# Patient Record
Sex: FEMALE | Race: WHITE | Employment: PART TIME | URBAN - METROPOLITAN AREA
[De-identification: names, ages, dates, MRNs, and addresses within clinical notes are randomized per-mention and may not be internally consistent; named-entity substitution may affect disease eponyms.]

---

## 2017-07-24 ENCOUNTER — OFFICE VISIT (OUTPATIENT)
Dept: ORTHOPEDIC SURGERY | Age: 47
End: 2017-07-24

## 2017-07-24 ENCOUNTER — TELEPHONE (OUTPATIENT)
Dept: ORTHOPEDIC SURGERY | Age: 47
End: 2017-07-24

## 2017-07-24 VITALS
HEART RATE: 78 BPM | WEIGHT: 158.07 LBS | HEIGHT: 68 IN | DIASTOLIC BLOOD PRESSURE: 66 MMHG | BODY MASS INDEX: 23.96 KG/M2 | SYSTOLIC BLOOD PRESSURE: 122 MMHG

## 2017-07-24 DIAGNOSIS — S92.352A JONES FRACTURE, LEFT, CLOSED, INITIAL ENCOUNTER: Primary | ICD-10-CM

## 2017-07-24 PROBLEM — S99.199A JONES FRACTURE: Status: ACTIVE | Noted: 2017-07-24

## 2017-07-24 PROCEDURE — L4361 PNEUMA/VAC WALK BOOT PRE OTS: HCPCS | Performed by: PODIATRIST

## 2017-07-24 PROCEDURE — 99214 OFFICE O/P EST MOD 30 MIN: CPT | Performed by: PODIATRIST

## 2017-07-24 RX ORDER — MINOCYCLINE HYDROCHLORIDE 50 MG/1
CAPSULE ORAL
Refills: 4 | COMMUNITY
Start: 2017-06-15

## 2017-08-14 ENCOUNTER — OFFICE VISIT (OUTPATIENT)
Dept: ORTHOPEDIC SURGERY | Age: 47
End: 2017-08-14

## 2017-08-14 VITALS
DIASTOLIC BLOOD PRESSURE: 65 MMHG | SYSTOLIC BLOOD PRESSURE: 110 MMHG | HEIGHT: 68 IN | WEIGHT: 158.07 LBS | HEART RATE: 78 BPM | BODY MASS INDEX: 23.96 KG/M2

## 2017-08-14 DIAGNOSIS — S92.352D JONES FRACTURE, LEFT, WITH ROUTINE HEALING, SUBSEQUENT ENCOUNTER: ICD-10-CM

## 2017-08-14 DIAGNOSIS — M79.672 FOOT PAIN, LEFT: Primary | ICD-10-CM

## 2017-08-14 PROCEDURE — 99213 OFFICE O/P EST LOW 20 MIN: CPT | Performed by: PODIATRIST

## 2017-08-14 PROCEDURE — 73630 X-RAY EXAM OF FOOT: CPT | Performed by: PODIATRIST

## 2017-09-07 ENCOUNTER — OFFICE VISIT (OUTPATIENT)
Dept: ORTHOPEDIC SURGERY | Age: 47
End: 2017-09-07

## 2017-09-07 VITALS
WEIGHT: 156.75 LBS | BODY MASS INDEX: 23.76 KG/M2 | SYSTOLIC BLOOD PRESSURE: 103 MMHG | DIASTOLIC BLOOD PRESSURE: 65 MMHG | HEIGHT: 68 IN | HEART RATE: 70 BPM

## 2017-09-07 DIAGNOSIS — M79.672 FOOT PAIN, LEFT: Primary | ICD-10-CM

## 2017-09-07 DIAGNOSIS — S92.352D JONES FRACTURE, LEFT, WITH ROUTINE HEALING, SUBSEQUENT ENCOUNTER: ICD-10-CM

## 2017-09-07 PROCEDURE — 99213 OFFICE O/P EST LOW 20 MIN: CPT | Performed by: PODIATRIST

## 2017-09-07 PROCEDURE — 73630 X-RAY EXAM OF FOOT: CPT | Performed by: PODIATRIST

## 2017-10-02 ENCOUNTER — OFFICE VISIT (OUTPATIENT)
Dept: ORTHOPEDIC SURGERY | Age: 47
End: 2017-10-02

## 2017-10-02 VITALS
SYSTOLIC BLOOD PRESSURE: 122 MMHG | BODY MASS INDEX: 23.76 KG/M2 | HEIGHT: 68 IN | WEIGHT: 156.75 LBS | HEART RATE: 71 BPM | DIASTOLIC BLOOD PRESSURE: 77 MMHG

## 2017-10-02 DIAGNOSIS — S92.352D JONES FRACTURE, LEFT, WITH ROUTINE HEALING, SUBSEQUENT ENCOUNTER: ICD-10-CM

## 2017-10-02 DIAGNOSIS — M79.672 FOOT PAIN, LEFT: Primary | ICD-10-CM

## 2017-10-02 PROCEDURE — 99213 OFFICE O/P EST LOW 20 MIN: CPT | Performed by: PODIATRIST

## 2017-10-02 NOTE — MR AVS SNAPSHOT
Progesterone 100 MG CAPS Take  by mouth daily. venlafaxine (EFFEXOR) 75 MG tablet Take 75 mg by mouth 2 times daily. gabapentin (NEURONTIN) 600 MG tablet   Take by mouth 3 times daily 1 in am, 2 tabs in pm    Nutritional Supplements (HRT SUPPORT PO) Take  by mouth. Pellet injection    traZODone (DESYREL) 50 MG tablet 25 mg nightly. Allergies              Penicillins Rash    Sulfa Antibiotics Rash    Codeine Other (See Comments)    depressed    Doxycycline Other (See Comments)    Body aches         Additional Information        Basic Information     Date Of Birth Sex Race Ethnicity Preferred Language    1970 Female White Non-/Non  English      Problem List as of 10/2/2017  Date Reviewed: 10/2/2017                Fred Friend fracture    Status post debridement, lateral epicondyle     Tennis elbow syndrome    Tennis elbow    Right elbow pain      Preventive Care        Date Due    HIV screening is recommended for all people regardless of risk factors  aged 15-65 years at least once (lifetime) who have never been HIV tested. 4/10/1985    Tetanus Combination Vaccine (1 - Tdap) 4/10/1989    Pap Smear 4/10/1991    Cholesterol Screening 4/10/2010    Yearly Flu Vaccine (1) 9/1/2017            [employer] Signup           [employer] allows you to send messages to your doctor, view your test results, renew your prescriptions, schedule appointments, view visit notes, and more. How Do I Sign Up? 1. In your Internet browser, go to [email or web address]  2. Click on the Sign Up Now link in the Sign In box. You will see the New Member Sign Up page. 3. Enter your [employer] Access Code exactly as it appears below. You will not need to use this code after youve completed the sign-up process. If you do not sign up before the expiration date, you must request a new code.   [employer] Access Code: [email or web address]  Expires: 12/1/2017  9:41 AM 4. Enter your Social Security Number ([id number]) and Date of Birth (mm/dd/yyyy) as indicated and click Submit. You will be taken to the next sign-up page. 5. Create a [employer]Leaf ID. This will be your [employer] login ID and cannot be changed, so think of one that is secure and easy to remember. 6. Create a [employer]Leaf password. You can change your password at any time. 7. Enter your Password Reset Question and Answer. This can be used at a later time if you forget your password. 8. Enter your e-mail address. You will receive e-mail notification when new information is available in [address] 9. Click Sign Up. You can now view your medical record. Additional Information  If you have questions, please contact the physician practice where you receive care. Remember, [employer]Le[employer] is NOT to be used for urgent needs. For medical emergencies, dial 911. For questions regarding your [employer]Le[employer] account call [phone]. If you have a clinical question, please call your doctor's office.

## 2017-10-23 ENCOUNTER — OFFICE VISIT (OUTPATIENT)
Dept: ORTHOPEDIC SURGERY | Age: 47
End: 2017-10-23

## 2017-10-23 VITALS
WEIGHT: 156.75 LBS | HEART RATE: 68 BPM | SYSTOLIC BLOOD PRESSURE: 106 MMHG | HEIGHT: 68 IN | DIASTOLIC BLOOD PRESSURE: 72 MMHG | BODY MASS INDEX: 23.76 KG/M2

## 2017-10-23 DIAGNOSIS — S99.192G CLOSED FRACTURE OF BASE OF FIFTH METATARSAL BONE OF LEFT FOOT AT METAPHYSEAL-DIAPHYSEAL JUNCTION WITH DELAYED HEALING, SUBSEQUENT ENCOUNTER: ICD-10-CM

## 2017-10-23 DIAGNOSIS — M79.672 LEFT FOOT PAIN: Primary | ICD-10-CM

## 2017-10-23 PROCEDURE — 99213 OFFICE O/P EST LOW 20 MIN: CPT | Performed by: PODIATRIST

## 2017-10-23 PROCEDURE — 73630 X-RAY EXAM OF FOOT: CPT | Performed by: PODIATRIST

## 2017-10-30 ENCOUNTER — OFFICE VISIT (OUTPATIENT)
Dept: ORTHOPEDIC SURGERY | Age: 47
End: 2017-10-30

## 2017-10-30 VITALS
HEART RATE: 75 BPM | HEIGHT: 68 IN | WEIGHT: 156.75 LBS | BODY MASS INDEX: 23.76 KG/M2 | DIASTOLIC BLOOD PRESSURE: 76 MMHG | SYSTOLIC BLOOD PRESSURE: 118 MMHG

## 2017-10-30 DIAGNOSIS — S99.192K CLOSED FRACTURE OF BASE OF FIFTH METATARSAL BONE OF LEFT FOOT AT METAPHYSEAL-DIAPHYSEAL JUNCTION WITH NONUNION, SUBSEQUENT ENCOUNTER: Primary | ICD-10-CM

## 2017-10-30 PROCEDURE — 99213 OFFICE O/P EST LOW 20 MIN: CPT | Performed by: PODIATRIST

## 2017-10-30 NOTE — PROGRESS NOTES
HISTORY OF PRESENT ILLNESS:  This is a followup for a Soliz fracture of the left foot. The patient is still having pain. PHYSICAL EXAM:  There is minimal edema and there is no ecchymosis. There is mild pain on palpation over the fracture site. Pedal pulses are palpable. Sensation is grossly intact. X-RAYS:  Review of her left foot MRI demonstrates a nonhealed Soliz fracture of the 5th metatarsal.      ASSESSMENT: Soliz Fracture, nonunion left foot    PLAN:  I reveiwed the MRI with the patient. I recommended open reduction internal fixation of the chronic fracture. Different surgical options were presented. The potential complications of any surgery were discussed. These included but were not limited to infection, pain, swelling, bleeding, numbness, recurrence of the problem, delayed union, nonunion, and need for further surgery to address complications. The patient verbally acknowledged consideration of these potential complications. I feel that have answered all of the preop questions regarding this problem. Certainly if they have any further questions, they can call the office. She will get with [facility] to schedule the procedure. In the meantime, she will continue with the boot for symptomatic relief.

## 2017-11-03 ENCOUNTER — TELEPHONE (OUTPATIENT)
Dept: ORTHOPEDIC SURGERY | Age: 47
End: 2017-11-03

## 2017-11-07 DIAGNOSIS — S99.192K CLOSED FRACTURE OF BASE OF FIFTH METATARSAL BONE OF LEFT FOOT AT METAPHYSEAL-DIAPHYSEAL JUNCTION WITH NONUNION, SUBSEQUENT ENCOUNTER: ICD-10-CM

## 2017-11-09 LAB
VITAMIN D2 AND D3, TOTAL: 92.4 NG/ML (ref 30–80)
VITAMIN D2, 25 HYDROXY: <1 NG/ML
VITAMIN D3,25 HYDROXY: 92.4 NG/ML

## 2017-11-10 ENCOUNTER — HOSPITAL ENCOUNTER (OUTPATIENT)
Dept: SURGERY | Age: 47
Discharge: HOME | End: 2017-11-10
Attending: PODIATRIST | Admitting: PODIATRIST

## 2017-11-10 VITALS
BODY MASS INDEX: 25.01 KG/M2 | HEART RATE: 66 BPM | HEIGHT: 68 IN | RESPIRATION RATE: 16 BRPM | TEMPERATURE: 98.6 F | DIASTOLIC BLOOD PRESSURE: 78 MMHG | OXYGEN SATURATION: 98 % | WEIGHT: 165 LBS | SYSTOLIC BLOOD PRESSURE: 148 MMHG

## 2017-11-10 LAB — PREGNANCY, URINE: NEGATIVE

## 2017-11-10 RX ORDER — ONDANSETRON 2 MG/ML
4 INJECTION INTRAMUSCULAR; INTRAVENOUS EVERY 30 MIN PRN
Status: DISCONTINUED | OUTPATIENT
Start: 2017-11-10 | End: 2017-11-11 | Stop reason: HOSPADM

## 2017-11-10 RX ORDER — MEPERIDINE HYDROCHLORIDE 50 MG/ML
12.5 INJECTION INTRAMUSCULAR; INTRAVENOUS; SUBCUTANEOUS EVERY 5 MIN PRN
Status: DISCONTINUED | OUTPATIENT
Start: 2017-11-10 | End: 2017-11-11 | Stop reason: HOSPADM

## 2017-11-10 RX ORDER — OXYCODONE HYDROCHLORIDE AND ACETAMINOPHEN 5; 325 MG/1; MG/1
1 TABLET ORAL PRN
Status: ACTIVE | OUTPATIENT
Start: 2017-11-10 | End: 2017-11-10

## 2017-11-10 RX ORDER — SODIUM CHLORIDE 0.9 % (FLUSH) 0.9 %
10 SYRINGE (ML) INJECTION EVERY 12 HOURS SCHEDULED
Status: DISCONTINUED | OUTPATIENT
Start: 2017-11-10 | End: 2017-11-11 | Stop reason: HOSPADM

## 2017-11-10 RX ORDER — SODIUM CHLORIDE, SODIUM LACTATE, POTASSIUM CHLORIDE, CALCIUM CHLORIDE 600; 310; 30; 20 MG/100ML; MG/100ML; MG/100ML; MG/100ML
INJECTION, SOLUTION INTRAVENOUS CONTINUOUS
Status: DISCONTINUED | OUTPATIENT
Start: 2017-11-10 | End: 2017-11-11 | Stop reason: HOSPADM

## 2017-11-10 RX ORDER — DIPHENHYDRAMINE HYDROCHLORIDE 50 MG/ML
6.25 INJECTION INTRAMUSCULAR; INTRAVENOUS
Status: ACTIVE | OUTPATIENT
Start: 2017-11-10 | End: 2017-11-10

## 2017-11-10 RX ORDER — OXYCODONE HYDROCHLORIDE AND ACETAMINOPHEN 5; 325 MG/1; MG/1
2 TABLET ORAL PRN
Status: ACTIVE | OUTPATIENT
Start: 2017-11-10 | End: 2017-11-10

## 2017-11-10 RX ORDER — HYDRALAZINE HYDROCHLORIDE 20 MG/ML
5 INJECTION INTRAMUSCULAR; INTRAVENOUS EVERY 30 MIN PRN
Status: DISCONTINUED | OUTPATIENT
Start: 2017-11-10 | End: 2017-11-11 | Stop reason: HOSPADM

## 2017-11-10 RX ORDER — VENLAFAXINE HYDROCHLORIDE 37.5 MG/1
112.5 CAPSULE, EXTENDED RELEASE ORAL
Status: DISCONTINUED | OUTPATIENT
Start: 2017-11-11 | End: 2017-11-11 | Stop reason: HOSPADM

## 2017-11-10 RX ORDER — SODIUM CHLORIDE 0.9 % (FLUSH) 0.9 %
10 SYRINGE (ML) INJECTION PRN
Status: DISCONTINUED | OUTPATIENT
Start: 2017-11-10 | End: 2017-11-11 | Stop reason: HOSPADM

## 2017-11-10 RX ORDER — PROMETHAZINE HYDROCHLORIDE 25 MG/1
25 TABLET ORAL EVERY 6 HOURS PRN
Qty: 30 TABLET | Refills: 0 | Status: SHIPPED | OUTPATIENT
Start: 2017-11-10 | End: 2017-11-17

## 2017-11-10 RX ORDER — LIDOCAINE HYDROCHLORIDE 10 MG/ML
1 INJECTION, SOLUTION EPIDURAL; INFILTRATION; INTRACAUDAL; PERINEURAL
Status: ACTIVE | OUTPATIENT
Start: 2017-11-10 | End: 2017-11-10

## 2017-11-10 RX ORDER — LABETALOL HYDROCHLORIDE 5 MG/ML
5 INJECTION, SOLUTION INTRAVENOUS
Status: DISCONTINUED | OUTPATIENT
Start: 2017-11-10 | End: 2017-11-11 | Stop reason: HOSPADM

## 2017-11-10 RX ADMIN — VENLAFAXINE HYDROCHLORIDE 112.5 MG: 37.5 CAPSULE, EXTENDED RELEASE ORAL at 10:31

## 2017-11-10 RX ADMIN — SODIUM CHLORIDE, SODIUM LACTATE, POTASSIUM CHLORIDE, CALCIUM CHLORIDE: 600; 310; 30; 20 INJECTION, SOLUTION INTRAVENOUS at 07:37

## 2017-11-10 ASSESSMENT — PAIN SCALES - GENERAL
PAINLEVEL_OUTOF10: 0

## 2017-11-10 ASSESSMENT — PAIN - FUNCTIONAL ASSESSMENT: PAIN_FUNCTIONAL_ASSESSMENT: 0-10

## 2017-11-10 NOTE — ANESTHESIA PRE-OP
Department of Anesthesiology  Preprocedure Note       Name:  Nevaeh Escalante   Age:  52 y.o.  :  1970                                          MRN:  [id number]         Date:  11/10/2017      Surgeon:    Procedure:    Medications prior to admission:   Prior to Admission medications    Medication Sig Start Date End Date Taking? Authorizing Provider   Probiotic Product (PROBIOTIC PO) Take by mouth 2 times daily   Yes Historical Provider, MD   Cholecalciferol (VITAMIN D PO) Take 10,000 Units by mouth 2 times daily   Yes Historical Provider, MD   magnesium (MAGNESIUM-OXIDE) 250 MG TABS tablet Take 250 mg by mouth daily   Yes Historical Provider, MD   Cyanocobalamin (VITAMIN B12 SL) Place under the tongue daily   Yes Historical Provider, MD   zinc 50 MG CAPS Take by mouth daily   Yes Historical Provider, MD   Bobo Ashly Oil 1000 MG CAPS Take by mouth daily   Yes Historical Provider, MD   Milk Thistle 250 MG CAPS Take by mouth daily   Yes Historical Provider, MD   VITAMIN K PO Take 100 mcg by mouth daily   Yes Historical Provider, MD   Yanelis Root 500 MG CAPS Take by mouth every other day   Yes Historical Provider, MD   EVENING PRIMROSE OIL PO Take 1,500 mg by mouth every other day   Yes Historical Provider, MD   Ubiquinol 100 MG CAPS Take 100 mg by mouth daily   Yes Historical Provider, MD   Ascorbic Acid (VITAMIN C) 1000 MG tablet Take 1,000 mg by mouth daily   Yes Historical Provider, MD   Nettle Root POWD 450 mg by Does not apply route daily   Yes Historical Provider, MD   minocycline (MINOCIN;DYNACIN) 50 MG capsule  6/15/17  Yes Historical Provider, MD   Progesterone 100 MG CAPS Take  by mouth daily. Yes Historical Provider, MD   venlafaxine (EFFEXOR) 75 MG tablet Take 150 mg by mouth 2 times daily    Yes Historical Provider, MD   gabapentin (NEURONTIN) 600 MG tablet Take 1,200 mg by mouth 2 times daily 1 in am, 2 tabs in pm    Yes Historical Provider, MD   Nutritional Supplements (HRT SUPPORT PO) Take  by mouth. Never Used    Alcohol use 0.5 oz/week     1 Standard drinks or equivalent per week                                Counseling given: Not Answered      Vital Signs (Current):   Vitals:    11/10/17 0721   BP: 122/83   Pulse: 67   Resp: 20   Temp: 98.3 °F (36.8 °C)   TempSrc: Temporal   SpO2: 97%   Weight: 165 lb (74.8 kg)   Height: 5' 8\" (1.727 m)                                              BP Readings from Last 3 Encounters:   11/10/17 122/83   10/30/17 118/76   10/23/17 106/72       NPO Status: Time of last liquid consumption: 2300                        Time of last solid consumption: 1900                        Date of last liquid consumption: 11/09/17                        Date of last solid food consumption: 11/09/17    BMI:   Wt Readings from Last 3 Encounters:   11/10/17 165 lb (74.8 kg)   11/06/17 165 lb (74.8 kg)   10/30/17 156 lb 12 oz (71.1 kg)     Body mass index is 25.09 kg/m². Anesthesia Evaluation  Patient summary reviewed and Nursing notes reviewed no history of anesthetic complications:   Airway: Mallampati: II     Neck ROM: full   Dental:          Pulmonary:                              Cardiovascular:                      Neuro/Psych:   (+) psychiatric history:            GI/Hepatic/Renal:             Endo/Other:                     Abdominal:           Vascular:                                      Anesthesia Plan      general     ASA 1     (Medications & allergies reviewed  All available lab & EKG data reviewed)  Induction: intravenous. Anesthetic plan and risks discussed with patient. Plan discussed with CRNA.                   Sergio St MD   11/10/2017

## 2017-11-16 ENCOUNTER — OFFICE VISIT (OUTPATIENT)
Dept: ORTHOPEDIC SURGERY | Age: 47
End: 2017-11-16

## 2017-11-16 DIAGNOSIS — Z09 POSTOP CHECK: Primary | ICD-10-CM

## 2017-11-16 PROCEDURE — 73630 X-RAY EXAM OF FOOT: CPT | Performed by: PODIATRIST

## 2017-11-16 PROCEDURE — 99024 POSTOP FOLLOW-UP VISIT: CPT | Performed by: PODIATRIST

## 2017-11-16 NOTE — PROGRESS NOTES
Initial postop check. She denies any fever or chills. No signs of infection are present. 3 nonweightbearing views of the left foot were taken. This demonstrates excellent position of the Soliz bolt with good  Position of the fracture ends. Status post Soliz fracture percutaneous fixation left foot and one week    She will remain nonweightbearing. See postop dressing was changed. I'll see her back in 10 days.

## 2017-11-27 ENCOUNTER — OFFICE VISIT (OUTPATIENT)
Dept: ORTHOPEDIC SURGERY | Age: 47
End: 2017-11-27

## 2017-11-27 VITALS — BODY MASS INDEX: 23.7 KG/M2 | WEIGHT: 160 LBS | HEIGHT: 69 IN

## 2017-11-27 DIAGNOSIS — Z09 POSTOP CHECK: Primary | ICD-10-CM

## 2017-11-27 PROCEDURE — 99024 POSTOP FOLLOW-UP VISIT: CPT | Performed by: PODIATRIST

## 2017-12-11 ENCOUNTER — OFFICE VISIT (OUTPATIENT)
Dept: ORTHOPEDIC SURGERY | Age: 47
End: 2017-12-11

## 2017-12-11 VITALS
BODY MASS INDEX: 23.71 KG/M2 | HEART RATE: 66 BPM | SYSTOLIC BLOOD PRESSURE: 130 MMHG | DIASTOLIC BLOOD PRESSURE: 74 MMHG | HEIGHT: 69 IN | WEIGHT: 160.05 LBS

## 2017-12-11 DIAGNOSIS — Z09 POSTOP CHECK: Primary | ICD-10-CM

## 2017-12-11 PROCEDURE — 73630 X-RAY EXAM OF FOOT: CPT | Performed by: PODIATRIST

## 2017-12-11 PROCEDURE — 99024 POSTOP FOLLOW-UP VISIT: CPT | Performed by: PODIATRIST

## 2018-01-08 ENCOUNTER — OFFICE VISIT (OUTPATIENT)
Dept: ORTHOPEDIC SURGERY | Age: 48
End: 2018-01-08

## 2018-01-08 VITALS
SYSTOLIC BLOOD PRESSURE: 110 MMHG | BODY MASS INDEX: 23.71 KG/M2 | HEIGHT: 69 IN | DIASTOLIC BLOOD PRESSURE: 68 MMHG | WEIGHT: 160.05 LBS | HEART RATE: 74 BPM

## 2018-01-08 DIAGNOSIS — S99.192S CLOSED FRACTURE OF BASE OF FIFTH METATARSAL BONE OF LEFT FOOT AT METAPHYSEAL-DIAPHYSEAL JUNCTION, SEQUELA: ICD-10-CM

## 2018-01-08 DIAGNOSIS — Z09 POSTOP CHECK: Primary | ICD-10-CM

## 2018-01-08 PROCEDURE — 99024 POSTOP FOLLOW-UP VISIT: CPT | Performed by: PODIATRIST

## 2018-01-08 PROCEDURE — 73630 X-RAY EXAM OF FOOT: CPT | Performed by: PODIATRIST

## 2018-02-05 ENCOUNTER — OFFICE VISIT (OUTPATIENT)
Dept: ORTHOPEDIC SURGERY | Age: 48
End: 2018-02-05

## 2018-02-05 DIAGNOSIS — S99.192S CLOSED FRACTURE OF BASE OF FIFTH METATARSAL BONE OF LEFT FOOT AT METAPHYSEAL-DIAPHYSEAL JUNCTION, SEQUELA: Primary | ICD-10-CM

## 2018-02-05 DIAGNOSIS — Z09 POSTOP CHECK: ICD-10-CM

## 2018-02-05 PROCEDURE — 99024 POSTOP FOLLOW-UP VISIT: CPT | Performed by: PODIATRIST

## 2018-02-14 DIAGNOSIS — S99.192S CLOSED FRACTURE OF BASE OF FIFTH METATARSAL BONE OF LEFT FOOT AT METAPHYSEAL-DIAPHYSEAL JUNCTION, SEQUELA: Primary | ICD-10-CM

## 2018-02-26 ENCOUNTER — OFFICE VISIT (OUTPATIENT)
Dept: ORTHOPEDIC SURGERY | Age: 48
End: 2018-02-26

## 2018-02-26 VITALS
BODY MASS INDEX: 23.71 KG/M2 | HEIGHT: 69 IN | DIASTOLIC BLOOD PRESSURE: 60 MMHG | WEIGHT: 160.05 LBS | HEART RATE: 70 BPM | SYSTOLIC BLOOD PRESSURE: 110 MMHG

## 2018-02-26 DIAGNOSIS — Z09 POSTOP CHECK: Primary | ICD-10-CM

## 2018-02-26 DIAGNOSIS — S99.192S CLOSED FRACTURE OF BASE OF FIFTH METATARSAL BONE OF LEFT FOOT AT METAPHYSEAL-DIAPHYSEAL JUNCTION, SEQUELA: ICD-10-CM

## 2018-02-26 PROCEDURE — 99999 PR OFFICE/OUTPT VISIT,PROCEDURE ONLY: CPT | Performed by: PODIATRIST

## 2018-02-26 NOTE — PROGRESS NOTES
This is an approximate 4 month postop check for the left foot Soliz fracture ORIF. She states that she's doing well and not having pain with normal daily activity. She has yet to start physical therapy. She has minimal palpable tenderness over the fracture site. There is no erythema ecchymosis or edema present. Status post 5th metatarsal Soliz fracture ORIF left foot    She will finish physical therapy and increase activity as tolerated. I'll see her back as needed.

## 2018-04-12 PROBLEM — Z09 POSTOP CHECK: Status: RESOLVED | Noted: 2017-11-27 | Resolved: 2018-04-12

## 2020-05-07 ENCOUNTER — OFFICE VISIT (OUTPATIENT)
Dept: ORTHOPEDIC SURGERY | Age: 50
End: 2020-05-07
Payer: COMMERCIAL

## 2020-05-07 VITALS — BODY MASS INDEX: 23.95 KG/M2 | HEIGHT: 68 IN | WEIGHT: 158 LBS

## 2020-05-07 PROCEDURE — 99213 OFFICE O/P EST LOW 20 MIN: CPT | Performed by: PODIATRIST

## 2020-05-07 PROCEDURE — L1902 AFO ANKLE GAUNTLET PRE OTS: HCPCS | Performed by: PODIATRIST

## 2020-05-07 NOTE — PROGRESS NOTES
HISTORY OF PRESENT ILLNESS: This is a return visit for a 77-year-old female with a new chief complaint of left lateral ankle and foot pain. The  patient twisted the ankle and foot about 2 weeks ago. Her overall pain level has decreased steadily since then. She still does have pain with increased activity. Pain is present with weightbearing  and twisting motions of the ankle. The pain is best relieved with rest, ice, and  elevation. FAMILY HISTORY: Documented in chart. SOCIAL HISTORY:  Documented in chart. REVIEW OF SYSTEMS: The patient denies any fever, chills, or night sweats. The patient also denies developing any type of rash. The patient denies any problems with cardiovascular, pulmonary, gastrointestinal, neurologic, urologic, genitourinary, psychiatric, dermatologic, and HEENT systems. PHYSICAL EXAMINATION: The area of greatest palpable tenderness is at the  left lateral ankle, over the ATF ligament. There is mild pain over the dorsal lateral aspect of the left midfoot as well. An anterior drawer test does  not reproduce any gross instability. There is very mild focal edema of the  Dorsal lateral aspect of the left midfoot. This is where she has some slight ecchymosis as well. She does not have any palpable tenderness of the base of the fifth metatarsal or over Lisfranc's ligament. There is no pain at the deltoid ligament as well. No open lesions or fracture blisters are present. Pedal pulses are palpable bilateral.  The sensation is grossly intact bilateral.  There is no pain over the deltoid  ligament. There is no pain over the Achilles tendon and this is palpated to be  intact. Thompsonâs test is negative for a complete rupture of the Achilles  tendon. The patient is able to dorsiflex and plantarflex the foot, as well as  sree and invert independently. RADIOGRAPHS: Three nonweightbearing x-ray views of the left ankle were evaluated.   No acute fractures or dislocations

## 2020-06-04 ENCOUNTER — OFFICE VISIT (OUTPATIENT)
Dept: ORTHOPEDIC SURGERY | Age: 50
End: 2020-06-04
Payer: COMMERCIAL

## 2020-06-04 VITALS — TEMPERATURE: 98.3 F | BODY MASS INDEX: 23.4 KG/M2 | WEIGHT: 158 LBS | HEIGHT: 69 IN

## 2020-06-04 PROCEDURE — 99212 OFFICE O/P EST SF 10 MIN: CPT | Performed by: PODIATRIST

## 2023-10-20 NOTE — PROGRESS NOTES
CARDIOLOGY CONSULTATION      Patient Name: Neva Romero  Primary Care physician: Tomasz Quinn MD    Reason for Referral/Chief Complaint: Neva Romero is a 48 y.o. patient who is referred to cardiology clinic today to establish cardiac care. History of Present Illness:   Rahul Tobias a 48 y. o.female who present as a new patient at the request of Dr Haily Preciado PCP for palpitations. On 6/15/2023 patient was in the ED for anxiety attack, hypoxia, elevated troponin 88 and acute dyspnea. Patient had XR CHEST  on 6/15/2023 No focal areas of consolidation. No definite effusions. No evidence of pneumothoraces. Cardiac silhouette within normal limits for AP projection. EKG on 06/15/2023 showed normal sinus rhythm. She was diagnosed with pulmonary edema 2/2 altitude sickness. Today patient states he denies chest pain and SOB. Stated she was hospitalized out [id number] [facility] for palpitations. Stated she thinks in from her antidepressant, fells like she is crawling out of her skin. Yesterday she works out and walks, but last Friday she fells like she was in a \"flight our fight state. \" Her heart jumps and she gets cramps in her abdomen. She states she has palpations all day  that last on and off. States she chills flu like symptoms that have been present for several years. Reports mother ascending aortic aneurysm. Grandfather MI, CHF, sister has  atrial fibrillation, She states she is a . Denies dizziness, near-syncope or pastora syncope. Denies paroxysmal nocturnal dyspnea, orthopnea, bendopnea, increasing lower extremity edema or weight gain. Past Medical History:   has a past medical history of Anxiety, Depression, Soliz fracture, Menopausal syndrome, and Tennis elbow.   Sleep apnea  6/15/2023 patient was in the ED for anxiety attack, hpoxia, elevated troponin 88 and acute dyspnea    Surgical History:   has a past surgical history that includes Archbold tooth extraction; Elbow Debridement

## 2023-10-24 ENCOUNTER — TELEPHONE (OUTPATIENT)
Dept: CARDIOLOGY CLINIC | Age: 53
End: 2023-10-24

## 2023-10-24 ENCOUNTER — OFFICE VISIT (OUTPATIENT)
Dept: CARDIOLOGY CLINIC | Age: 53
End: 2023-10-24
Payer: COMMERCIAL

## 2023-10-24 VITALS — OXYGEN SATURATION: 99 % | HEART RATE: 65 BPM | SYSTOLIC BLOOD PRESSURE: 112 MMHG | DIASTOLIC BLOOD PRESSURE: 73 MMHG

## 2023-10-24 DIAGNOSIS — G47.30 SLEEP APNEA, UNSPECIFIED TYPE: ICD-10-CM

## 2023-10-24 DIAGNOSIS — Z76.89 ESTABLISHING CARE WITH NEW DOCTOR, ENCOUNTER FOR: Primary | ICD-10-CM

## 2023-10-24 DIAGNOSIS — R00.2 HEART PALPITATIONS: ICD-10-CM

## 2023-10-24 PROCEDURE — 99203 OFFICE O/P NEW LOW 30 MIN: CPT | Performed by: STUDENT IN AN ORGANIZED HEALTH CARE EDUCATION/TRAINING PROGRAM

## 2023-10-24 PROCEDURE — 93000 ELECTROCARDIOGRAM COMPLETE: CPT | Performed by: STUDENT IN AN ORGANIZED HEALTH CARE EDUCATION/TRAINING PROGRAM

## 2023-10-24 RX ORDER — ESTRADIOL 2 MG/1
TABLET ORAL
COMMUNITY
Start: 2023-10-18

## 2023-10-24 RX ORDER — VENLAFAXINE HYDROCHLORIDE 150 MG/1
150 CAPSULE, EXTENDED RELEASE ORAL
COMMUNITY

## 2023-10-24 RX ORDER — TRAZODONE HYDROCHLORIDE 50 MG/1
TABLET ORAL
COMMUNITY
Start: 2023-10-12

## 2023-10-24 NOTE — TELEPHONE ENCOUNTER
Monitor placed by nav ma  Monitor company vital connect  Length of monitor 1 week  Monitor ordered by amp  Serial number [id number]  Kit ID [id number]  Activation successful prior to pt leaving office?  Yes

## 2023-10-24 NOTE — PATIENT INSTRUCTIONS
Plan:  EKG Today   Recommend an echocardiogram which is an ultrasound of your heart to evaluate heart function, structures and valves. Your provider has ordered testing for further evaluation. An order/prescription has been included in your paper work. To schedule outpatient testing, contact Central Scheduling by calling [address] [place] [address] ([phone]).   Cardiac event monitor 1 week    Follow up as needed

## 2023-11-16 DIAGNOSIS — R00.2 HEART PALPITATIONS: ICD-10-CM

## 2023-11-17 ENCOUNTER — TELEPHONE (OUTPATIENT)
Dept: CARDIOLOGY CLINIC | Age: 53
End: 2023-11-17

## 2023-11-17 NOTE — TELEPHONE ENCOUNTER
----- Message from Ian Park DO sent at 11/17/2023 10:07 AM EST -----  Monitor showed rare PACs, PVCs and no sustained arrhythmias. No symptoms reported with these. No further management indicated at this time. Please call patient.    Thanks

## 2024-07-24 ENCOUNTER — HOSPITAL ENCOUNTER (OUTPATIENT)
Age: 54
Setting detail: OUTPATIENT SURGERY
Discharge: HOME | End: 2024-07-24
Attending: INTERNAL MEDICINE | Admitting: INTERNAL MEDICINE
Payer: COMMERCIAL

## 2024-07-24 ENCOUNTER — ANESTHESIA (OUTPATIENT)
Dept: ENDOSCOPY | Age: 54
End: 2024-07-24
Payer: COMMERCIAL

## 2024-07-24 ENCOUNTER — ANESTHESIA EVENT (OUTPATIENT)
Dept: ENDOSCOPY | Age: 54
End: 2024-07-24
Payer: COMMERCIAL

## 2024-07-24 VITALS
RESPIRATION RATE: 16 BRPM | OXYGEN SATURATION: 100 % | WEIGHT: 15 LBS | SYSTOLIC BLOOD PRESSURE: 118 MMHG | HEIGHT: 68 IN | TEMPERATURE: 97.7 F | HEART RATE: 59 BPM | BODY MASS INDEX: 2.27 KG/M2 | DIASTOLIC BLOOD PRESSURE: 73 MMHG

## 2024-07-24 DIAGNOSIS — Z12.11 COLON CANCER SCREENING: ICD-10-CM

## 2024-07-24 PROCEDURE — 3609010600 HC COLONOSCOPY POLYPECTOMY SNARE/COLD BIOPSY: Performed by: INTERNAL MEDICINE

## 2024-07-24 PROCEDURE — 7100000010 HC PHASE II RECOVERY - FIRST 15 MIN: Performed by: INTERNAL MEDICINE

## 2024-07-24 PROCEDURE — 88305 TISSUE EXAM BY PATHOLOGIST: CPT

## 2024-07-24 PROCEDURE — 3700000001 HC ADD 15 MINUTES (ANESTHESIA): Performed by: INTERNAL MEDICINE

## 2024-07-24 PROCEDURE — 3700000000 HC ANESTHESIA ATTENDED CARE: Performed by: INTERNAL MEDICINE

## 2024-07-24 PROCEDURE — 2580000003 HC RX 258: Performed by: ANESTHESIOLOGY

## 2024-07-24 PROCEDURE — 6360000002 HC RX W HCPCS

## 2024-07-24 PROCEDURE — 2709999900 HC NON-CHARGEABLE SUPPLY: Performed by: INTERNAL MEDICINE

## 2024-07-24 PROCEDURE — 7100000011 HC PHASE II RECOVERY - ADDTL 15 MIN: Performed by: INTERNAL MEDICINE

## 2024-07-24 RX ORDER — SODIUM CHLORIDE, SODIUM LACTATE, POTASSIUM CHLORIDE, CALCIUM CHLORIDE 600; 310; 30; 20 MG/100ML; MG/100ML; MG/100ML; MG/100ML
INJECTION, SOLUTION INTRAVENOUS CONTINUOUS
Status: DISCONTINUED | OUTPATIENT
Start: 2024-07-24 | End: 2024-07-24 | Stop reason: HOSPADM

## 2024-07-24 RX ORDER — LIDOCAINE HYDROCHLORIDE 20 MG/ML
INJECTION, SOLUTION INTRAVENOUS PRN
Status: DISCONTINUED | OUTPATIENT
Start: 2024-07-24 | End: 2024-07-24 | Stop reason: SDUPTHER

## 2024-07-24 RX ORDER — TRETINOIN 0.5 MG/G
0.05 CREAM TOPICAL NIGHTLY
COMMUNITY

## 2024-07-24 RX ORDER — PROGESTERONE 200 MG/1
200 CAPSULE ORAL DAILY
COMMUNITY
Start: 2023-12-21

## 2024-07-24 RX ORDER — PROPOFOL 10 MG/ML
INJECTION, EMULSION INTRAVENOUS PRN
Status: DISCONTINUED | OUTPATIENT
Start: 2024-07-24 | End: 2024-07-24 | Stop reason: SDUPTHER

## 2024-07-24 RX ORDER — PROPOFOL 10 MG/ML
INJECTION, EMULSION INTRAVENOUS CONTINUOUS PRN
Status: DISCONTINUED | OUTPATIENT
Start: 2024-07-24 | End: 2024-07-24 | Stop reason: SDUPTHER

## 2024-07-24 RX ADMIN — SODIUM CHLORIDE, POTASSIUM CHLORIDE, SODIUM LACTATE AND CALCIUM CHLORIDE: 600; 310; 30; 20 INJECTION, SOLUTION INTRAVENOUS at 08:33

## 2024-07-24 RX ADMIN — PROPOFOL 150 MCG/KG/MIN: 10 INJECTION, EMULSION INTRAVENOUS at 09:21

## 2024-07-24 RX ADMIN — LIDOCAINE HYDROCHLORIDE 100 MG: 20 INJECTION, SOLUTION INTRAVENOUS at 09:21

## 2024-07-24 RX ADMIN — PROPOFOL 50 MG: 10 INJECTION, EMULSION INTRAVENOUS at 09:21

## 2024-07-24 NOTE — H&P
Gastroenterology Note             Pre-operative History and Physical    Patient: Cici Pantoja  : 1970  CSN: [id number]    History Obtained From:  patient and/or guardian.     HISTORY OF PRESENT ILLNESS:    The patient is a 54 y.o. female  here for colon cancer screening.      Past Medical History:    Past Medical History:   Diagnosis Date    Anxiety     Depression     Soliz fracture 2017    Menopausal syndrome     Tennis elbow 3/13/2015     Past Surgical History:    Past Surgical History:   Procedure Laterality Date    ELBOW DEBRIDEMENT Right 4/9/15    extensor tendon repair    OTHER SURGICAL HISTORY  11/10/2017    ORIF Soliz fx left foot    WISDOM TOOTH EXTRACTION       Medications Prior to Admission:   No current facility-administered medications on file prior to encounter.     Current Outpatient Medications on File Prior to Encounter   Medication Sig Dispense Refill    progesterone (PROMETRIUM) 200 MG CAPS capsule Take 1 capsule by mouth daily      tretinoin (RETIN-A) 0.05 % cream Apply 0.05 % topically nightly      traZODone (DESYREL) 50 MG tablet       venlafaxine (EFFEXOR XR) 150 MG extended release capsule Take 1 capsule by mouth      GABAPENTIN PO       estradiol (ESTRACE) 2 MG tablet       tapentadol (NUCYNTA) 50 MG TABS Take one to two tablets every 8 hours as needed for pain. 30 tablet 0    Cholecalciferol (VITAMIN D PO) Take 10,000 Units by mouth 2 times daily      magnesium (MAGNESIUM-OXIDE) 250 MG TABS tablet Take 1 tablet by mouth daily      Cyanocobalamin (VITAMIN B12 SL) Place under the tongue daily      zinc 50 MG CAPS Take by mouth daily      Krill Oil 1000 MG CAPS Take by mouth daily      VITAMIN K PO Take 100 mcg by mouth daily      Ubiquinol 100 MG CAPS Take 100 mg by mouth daily      Ascorbic Acid (VITAMIN C) 1000 MG tablet Take 1 tablet by mouth daily          Allergies:  Penicillins, Sulfa antibiotics, Adhesive tape, Codeine, Doxycycline, Hydrocodone-acetaminophen, and

## 2024-07-24 NOTE — PROGRESS NOTES
Ambulatory Surgery/Procedure Discharge Note    Vitals:    07/24/24 0958   BP: 109/70   Pulse: 63   Resp: 16   Temp:    SpO2: 99%       In: 500 [I.V.:500]  Out: -     Restroom use offered before discharge.  No    Pain assessment:  level of pain (1-10, 10 severe),   Pain Level: 0    Pt and S.O./family states \"ready to go home\". Pt alert and oriented x4. IV removed. Denies N/V or pain. Voided prior to discharge. Pt tolerating po intake. Discharge instructions given to pt and daughter with pt permission. Pt and daughter verbalized understanding of all instructions. Left with all belongings,  and discharge instructions.     Patient discharged to home/self care. Patient discharged via wheel chair by transporter to waiting family/S.O.       7/24/2024 10:08 AM

## 2024-07-24 NOTE — ANESTHESIA PRE PROCEDURE
Department of Anesthesiology  Preprocedure Note       Name:  Cici Pantoja   Age:  54 y.o.  :  1970                                          MRN:  [id number]         Date:  2024      Surgeon: Surgeon(s):  Vega Parnell MD    Procedure: Procedure(s):  COLONOSCOPY    Medications prior to admission:   Prior to Admission medications    Medication Sig Start Date End Date Taking? Authorizing Provider   traZODone (DESYREL) 50 MG tablet  10/12/23   Renzo Decker MD   venlafaxine (EFFEXOR XR) 150 MG extended release capsule Take 1 capsule by mouth    Renzo Decker MD   GABAPENTIN PO  10/21/23   Renzo Decker MD   estradiol (ESTRACE) 2 MG tablet  10/18/23   Renzo Decker MD   tapentadol (NUCYNTA) 50 MG TABS Take one to two tablets every 8 hours as needed for pain. 11/10/17   Mohsen Pena DPM   Cholecalciferol (VITAMIN D PO) Take 10,000 Units by mouth 2 times daily    Renzo Decker MD   magnesium (MAGNESIUM-OXIDE) 250 MG TABS tablet Take 1 tablet by mouth daily    Renzo Decker MD   Cyanocobalamin (VITAMIN B12 SL) Place under the tongue daily    Renzo Decker MD   zinc 50 MG CAPS Take by mouth daily    Renzo Decker MD   Krill Oil 1000 MG CAPS Take by mouth daily    Renzo Decker MD   VITAMIN K PO Take 100 mcg by mouth daily    Renzo Decker MD   Ubiquinol 100 MG CAPS Take 100 mg by mouth daily    Renzo Decker MD   Ascorbic Acid (VITAMIN C) 1000 MG tablet Take 1 tablet by mouth daily    Renzo Decker MD       Current medications:    No current facility-administered medications for this encounter.       Allergies:    Allergies   Allergen Reactions    Penicillins Rash    Sulfa Antibiotics Rash    Adhesive Tape Itching    Codeine Other (See Comments)     depressed    Doxycycline Other (See Comments)     Body aches       Problem List:    Patient Active Problem List   Diagnosis Code    Tennis elbow M77.10    Right

## 2024-07-24 NOTE — PROCEDURES
Colonoscopy Procedure  Note          Patient: Cici Pantoja  : 1970  CRN:  @CRN@    Procedure: Colonoscopy with polypectomy (cold snare)    Date:  2024    Surgeon:  Vega Parnell MD, MD    Referring Physician:  Robe Jalloh MD    Preoperative Diagnosis:  Colon cancer screening [Z12.11]    Postoperative Diagnosis:  * No post-op diagnosis entered *    Anesthesia:  MAC    EBL: Minimal to none.    Indications: This is a 54 y.o. year old female who presents today with screening for colon cancer  Family history of colon polyps in her sister..      Procedure:   An informed consent was obtained from the patient after explanation of indications, benefits, possible risks and complications of the procedure.  The patient was then taken to the endoscopy suite, placed in the left lateral decubitus position, and the above IV anesthesia was administered.      Digital rectal examination was performed.  With the patient in the left lateral decubitus position the endoscope was inserted through the anorectal area into the rectum. The scope was then advanced through the length of the colon to the terminal ileum.  The quality of preparation was adequate.  The scope was carefully withdrawn and the mucosa was fully inspected including retroflexion in the rectum. Findings and interventions are described below.      The patient tolerated the procedure well and was taken to the PACU in good condition.  There were no immediate complications.      Impression:    Rectal exam revealed small external hemorrhoids that were not inflamed.  Terminal ileum was normal.  Polyp-6 mm, sessile in the mid to distal rectum removed by cold snare.  Rest of the colon mucosal evaluation was unremarkable.  Diverticulosis-none.    Recommendations:    Follow-up biopsies in the next 7 to 10 days.  Assuming polyp is benign, repeat colonoscopy in 5 years for polyp surveillance.  Family history of colon polyps in her sister.    Vega Parnell MD,

## 2024-07-24 NOTE — ANESTHESIA POSTPROCEDURE EVALUATION
Department of Anesthesiology  Postprocedure Note    Patient: Cici Pantoja  MRN: [id number]  YOB: 1970  Date of evaluation: 7/24/2024    Procedure Summary       Date: 07/24/24 Room / Location: [facility] ENDO  / [facility]    Anesthesia Start: 0916 Anesthesia Stop: 0942    Procedure: COLONOSCOPY POLYPECTOMY SNARE/BIOPSY Diagnosis:       Colon cancer screening      (Colon cancer screening [Z12.11])    Surgeons: Vega Parnell MD Responsible Provider: Fransico Figueredo MD    Anesthesia Type: MAC ASA Status: 2            Anesthesia Type: MAC    Tammy Phase I: Tammy Score: 10    Tammy Phase II: Tammy Score: 10    Anesthesia Post Evaluation    Patient location during evaluation: PACU  Patient participation: complete - patient participated  Level of consciousness: awake  Pain score: 0  Airway patency: patent  Nausea & Vomiting: no nausea  Cardiovascular status: hemodynamically stable  Respiratory status: acceptable  Hydration status: stable  Pain management: adequate    No notable events documented.

## 2024-07-24 NOTE — DISCHARGE INSTRUCTIONS
Follow-up biopsies in the next 7 to 10 days.  Assuming polyp is benign, repeat colonoscopy in 5 years for polyp surveillance.  Family history of colon polyps in her sister.    ENDOSCOPY DISCHARGE INSTRUCTIONS:    Call the physician that did your procedure for any questions or concern:    [facility]: [phone]  DR. FLY GIFFORD      ACTIVITY:    There are potential side effects to the medications used for sedation and anesthesia during your procedure.  These include:  Dizziness or light-headedness, confusion or memory loss, delayed reaction times, loss of coordination, nausea and vomiting.  Because of your increased risk for injury, we ask that you observe the following precautions:  For the next 24 hours,  DO NOT operate an automobile, bicycle, motorcycle, , power tools or large equipment of any kind.  Do not drink alcohol, sign any legal documents or make any legal decisions for 24 hours.  Do not bend your head over lower than your heart.  DO sit on the side of bed/couch awhile before getting up.  Plan on bedrest or quiet relaxation today.  You may resume normal activities in 24 hours.    DIET:    Your first meal today should be light, avoiding spicy and fatty foods.  If you tolerate this first meal, then you may advance to your regular diet unless otherwise advised by your physician.    NORMAL SYMPTOMS:  -Mild sore throat if you’ve had an EGD   -Gaseous discomfort    NOTIFY YOUR PHYSICIAN IF THESE SYMPTOMS OCCUR:  1. Fever (greater than 100)  5. Increased abdominal bloating  2. Severe pain    6. Excessive bleeding  3. Nausea and vomiting  7. Chest pain                                                                    4. Chills    8. Shortness of breath    ADDITIONAL INSTRUCTIONS:    Biopsy results: Call [facility] for biopsy results in 1 week       Colon Polyps: Care Instructions  Your Care Instructions     Colon polyps are growths in the colon or the rectum. The cause of most colon polyps is

## (undated) DEVICE — TRAP SPEC RETRV CLR PLAS POLYP IN LN SUCT QUIK CTCH

## (undated) DEVICE — CANNULA SAMP CO2 AD GRN 7FT CO2 AND 7FT O2 TBNG UNIV CONN

## (undated) DEVICE — SNARES COLD OVAL 10MM THIN